# Patient Record
Sex: MALE | Race: WHITE
[De-identification: names, ages, dates, MRNs, and addresses within clinical notes are randomized per-mention and may not be internally consistent; named-entity substitution may affect disease eponyms.]

---

## 2020-02-19 ENCOUNTER — HOSPITAL ENCOUNTER (OUTPATIENT)
Dept: HOSPITAL 53 - M SDC | Age: 40
Discharge: HOME | End: 2020-02-19
Attending: DENTIST
Payer: MEDICARE

## 2020-02-19 VITALS — BODY MASS INDEX: 33.29 KG/M2 | WEIGHT: 195 LBS | HEIGHT: 64 IN

## 2020-02-19 VITALS — SYSTOLIC BLOOD PRESSURE: 142 MMHG | DIASTOLIC BLOOD PRESSURE: 78 MMHG

## 2020-02-19 DIAGNOSIS — G80.9: ICD-10-CM

## 2020-02-19 DIAGNOSIS — E03.9: ICD-10-CM

## 2020-02-19 DIAGNOSIS — Z91.030: ICD-10-CM

## 2020-02-19 DIAGNOSIS — Z79.899: ICD-10-CM

## 2020-02-19 DIAGNOSIS — E78.5: ICD-10-CM

## 2020-02-19 DIAGNOSIS — I50.9: ICD-10-CM

## 2020-02-19 DIAGNOSIS — Z86.73: ICD-10-CM

## 2020-02-19 DIAGNOSIS — Z79.82: ICD-10-CM

## 2020-02-19 DIAGNOSIS — I10: ICD-10-CM

## 2020-02-19 DIAGNOSIS — K02.9: Primary | ICD-10-CM

## 2020-02-19 PROCEDURE — 41899 UNLISTED PX DENTALVLR STRUX: CPT

## 2020-02-19 PROCEDURE — 88300 SURGICAL PATH GROSS: CPT

## 2020-02-20 NOTE — RO
DATE OF PROCEDURE:  03/19/2020

 

PREOPERATIVE DIAGNOSIS:  Grossly decayed tooth #17 and 18 with tooth #17 partial

bony impaction.

 

POSTOPERATIVE DIAGNOSIS:  Grossly decayed tooth #17 and 18 with tooth #17 partial

bony impaction.

 

PROCEDURE:  Extraction of teeth #17 and 18 under general anesthesia via surgical

means.

 

SURGEON:  Jez Barrios DDS

 

ASSISTANT:  Gisela

 

ANESTHESIA:  Provided by Dr. Cook.

 

NURSE:  Nadia

 

The patient received approximately 350 mL of lactated ringers.

 

Blood loss for the case was less than 50 mL.

 

There were no complications.

 

The patient was later taken to the postanesthesia care unit (PACU) in stable

condition and discharged to home with postoperative care instructions as well as

a postoperative appointment.

 

INDICATIONS FOR THE PROCEDURE:  Mr. Curt Maynes is a 39-year-old male with

multiple health issues including cerebral palsy.  He has unsuccessfully attempted

to undergo sedation in an oral surgery office in the past. Given the difficulty

of that procedure, we elected to take him to the main operating room and put him

under general anesthesia for the removal of these two teeth.  All the risks,

complications and alternatives were discussed with the patient who readily agreed

along with his caretaker, his mother, to undergo the procedure with general

anesthesia in the main operating room.  All questions were answered.

 

DESCRIPTION OF PROCEDURE:  The patient was taken to the PACU.  All necessary

paperwork was completed.  He was then taken from the PACU to the operating room

#3 where he was placed under general anesthesia via oral endotracheal intubation

with no complications.  The tube was secured by the anesthesiology team.  A time

out was then conducted.  The throat was suctioned clean and dry and a throat pack

was placed.  Local anesthetic in the form of 2% lidocaine with 1:100,000

epinephrine was injected along the left side of the patient's mandible with FELIPE

block.  He received 6.8 mL of the anesthetic.  An incision was then made with a

distal buccal release distal to tooth #17. It was carried in a sulcular fashion

anteriorly to the location of the mesial papilla of #19.  This tissue was then

elevated in a subperiosteal elevation.  The bone was then reduced around 17 and

18 using a 703 bur with copious irrigation.  Tooth #17 was then sectioned as well

as 18.  The root remnants of 17 and 18 were completely removed.  The teeth were

thoroughly curetted and irrigated with copious amounts of normal saline.  After

irrigation, the wound was closed with a #3-0 chromic gut suture in a running

fashion.  The throat was then suctioned clean and dry and the throat pack was

removed.  The patient was allowed to emerge from general anesthesia and did so

without any complications.  He was taken the PACU in stable condition and later

discharged to home with postoperative care instructions as well as followup.